# Patient Record
Sex: MALE | Race: WHITE | Employment: FULL TIME | URBAN - METROPOLITAN AREA
[De-identification: names, ages, dates, MRNs, and addresses within clinical notes are randomized per-mention and may not be internally consistent; named-entity substitution may affect disease eponyms.]

---

## 2021-07-29 ENCOUNTER — HOSPITAL ENCOUNTER (OUTPATIENT)
Age: 31
Discharge: HOME | End: 2021-07-29
Payer: COMMERCIAL

## 2021-07-29 VITALS
HEART RATE: 69 BPM | DIASTOLIC BLOOD PRESSURE: 77 MMHG | TEMPERATURE: 97 F | OXYGEN SATURATION: 100 % | SYSTOLIC BLOOD PRESSURE: 130 MMHG | RESPIRATION RATE: 18 BRPM

## 2021-07-29 DIAGNOSIS — J01.40 ACUTE NON-RECURRENT PANSINUSITIS: Primary | ICD-10-CM

## 2021-07-29 PROCEDURE — 99203 OFFICE O/P NEW LOW 30 MIN: CPT | Performed by: EMERGENCY MEDICINE

## 2021-07-29 RX ORDER — AMOXICILLIN AND CLAVULANATE POTASSIUM 875; 125 MG/1; MG/1
1 TABLET, FILM COATED ORAL 2 TIMES DAILY
Qty: 20 TABLET | Refills: 0 | Status: SHIPPED | OUTPATIENT
Start: 2021-07-29 | End: 2021-08-08

## 2021-07-29 NOTE — ED INITIAL ASSESSMENT (HPI)
Pt came in due to nasal congestion and sinus pressure for the past week. Pt denies any fever, chills, cough, or body aches. Pt has easy non labored respirations. Pt is fully verbal and ambulatory.

## 2021-07-29 NOTE — ED PROVIDER NOTES
Patient Seen in: [facility]      History   Patient presents with:  Cough/URI    Stated Complaint: Sinus infection    HPI/Subjective:   HPI    Anat Oh is a 26year old  male here for sinus sx that started 2 weeks ago.  Got better a effusions with mild bulge. No erythema. Nose: Congestion and rhinorrhea (yellow) present. Mouth/Throat:      Mouth: Mucous membranes are moist.      Pharynx: Posterior oropharyngeal erythema present. No oropharyngeal exudate.       Comments: PND ( failure, alcohol abuse, stomach bleed/ulcer, allergy, kidney disease/failure, or if  \"kidneys don't work well. \"    Ibuprofen is the same medication as Motrin, and Advil.     Aleve, ibuprofen, and aspirin are all the same classes of medications called NSAI Tab  Take 1 tablet by mouth 2 (two) times daily for 10 days. , Normal, Disp-20 tablet, R-0

## 2021-09-27 ENCOUNTER — LABORATORY ENCOUNTER (OUTPATIENT)
Dept: LAB | Facility: REFERENCE LAB | Age: 31
End: 2021-09-27
Attending: FAMILY MEDICINE
Payer: COMMERCIAL

## 2021-09-27 ENCOUNTER — OFFICE VISIT (OUTPATIENT)
Dept: FAMILY MEDICINE CLINIC | Facility: CLINIC | Age: 31
End: 2021-09-27
Payer: COMMERCIAL

## 2021-09-27 VITALS
WEIGHT: 179.31 LBS | DIASTOLIC BLOOD PRESSURE: 72 MMHG | HEIGHT: 71 IN | OXYGEN SATURATION: 98 % | HEART RATE: 65 BPM | BODY MASS INDEX: 25.1 KG/M2 | SYSTOLIC BLOOD PRESSURE: 120 MMHG

## 2021-09-27 DIAGNOSIS — B36.0 TINEA VERSICOLOR: ICD-10-CM

## 2021-09-27 DIAGNOSIS — Z00.00 PHYSICAL EXAM, ANNUAL: Primary | ICD-10-CM

## 2021-09-27 DIAGNOSIS — Z13.220 SCREENING FOR HYPERLIPIDEMIA: ICD-10-CM

## 2021-09-27 DIAGNOSIS — Z13.1 DIABETES MELLITUS SCREENING: ICD-10-CM

## 2021-09-27 PROBLEM — K21.9 GERD (GASTROESOPHAGEAL REFLUX DISEASE): Status: ACTIVE | Noted: 2021-09-27

## 2021-09-27 LAB
CHOLEST SERPL-MCNC: 183 MG/DL (ref ?–200)
DEPRECATED RDW RBC AUTO: 38.3 FL (ref 35.1–46.3)
ERYTHROCYTE [DISTWIDTH] IN BLOOD BY AUTOMATED COUNT: 11.8 % (ref 11–15)
EST. AVERAGE GLUCOSE BLD GHB EST-MCNC: 103 MG/DL (ref 68–126)
HBA1C MFR BLD HPLC: 5.2 % (ref ?–5.7)
HCT VFR BLD AUTO: 42.1 %
HDLC SERPL-MCNC: 66 MG/DL (ref 40–59)
HGB BLD-MCNC: 14.1 G/DL
LDLC SERPL CALC-MCNC: 108 MG/DL (ref ?–100)
MCH RBC QN AUTO: 30.1 PG (ref 26–34)
MCHC RBC AUTO-ENTMCNC: 33.5 G/DL (ref 31–37)
MCV RBC AUTO: 89.8 FL
NONHDLC SERPL-MCNC: 117 MG/DL (ref ?–130)
PATIENT FASTING Y/N/NP: NO
PLATELET # BLD AUTO: 186 10(3)UL (ref 150–450)
RBC # BLD AUTO: 4.69 X10(6)UL
TRIGL SERPL-MCNC: 45 MG/DL (ref 30–149)
VLDLC SERPL CALC-MCNC: 8 MG/DL (ref 0–30)
WBC # BLD AUTO: 6.4 X10(3) UL (ref 4–11)

## 2021-09-27 PROCEDURE — 99395 PREV VISIT EST AGE 18-39: CPT | Performed by: FAMILY MEDICINE

## 2021-09-27 PROCEDURE — 3008F BODY MASS INDEX DOCD: CPT | Performed by: FAMILY MEDICINE

## 2021-09-27 PROCEDURE — 80061 LIPID PANEL: CPT | Performed by: FAMILY MEDICINE

## 2021-09-27 PROCEDURE — 36415 COLL VENOUS BLD VENIPUNCTURE: CPT | Performed by: FAMILY MEDICINE

## 2021-09-27 PROCEDURE — 83036 HEMOGLOBIN GLYCOSYLATED A1C: CPT | Performed by: FAMILY MEDICINE

## 2021-09-27 PROCEDURE — 3078F DIAST BP <80 MM HG: CPT | Performed by: FAMILY MEDICINE

## 2021-09-27 PROCEDURE — 90686 IIV4 VACC NO PRSV 0.5 ML IM: CPT | Performed by: FAMILY MEDICINE

## 2021-09-27 PROCEDURE — 90471 IMMUNIZATION ADMIN: CPT | Performed by: FAMILY MEDICINE

## 2021-09-27 PROCEDURE — 85027 COMPLETE CBC AUTOMATED: CPT | Performed by: FAMILY MEDICINE

## 2021-09-27 PROCEDURE — 3074F SYST BP LT 130 MM HG: CPT | Performed by: FAMILY MEDICINE

## 2021-09-27 RX ORDER — FLUCONAZOLE 150 MG/1
300 TABLET ORAL WEEKLY
Qty: 4 TABLET | Refills: 0 | Status: SHIPPED | OUTPATIENT
Start: 2021-09-27 | End: 2021-10-05

## 2021-09-27 NOTE — PROGRESS NOTES
Richie Gannon. is a 26year old male who presents for a complete physical exam.   HPI:     GERD: Chronic. Knows his triggers (coffee before runs. Spicy foods). Takes 1-2 TUMS prn and works well.  Prilosec made him too bloated in the past.    Rash: [place] GENERAL: well developed, well nourished, in no apparent distress  SKIN: hypopigmented macules on b/l UEs, neck, back, and to a lesser extent on chest. no suspicious lesions  HEENT: atraumatic, normocephalic, MMM, nose normal, Tms & EACs normal  EYES: PER QUAD PRESERVATIVE FREE 0.5 ML    Return in 1 year for annual physical or sooner as needed.      Stacey Olivier DO  09/27/21   11:37 AM

## 2021-11-12 ENCOUNTER — MED REC SCAN ONLY (OUTPATIENT)
Dept: FAMILY MEDICINE CLINIC | Facility: CLINIC | Age: 31
End: 2021-11-12

## 2021-12-17 ENCOUNTER — MED REC SCAN ONLY (OUTPATIENT)
Dept: FAMILY MEDICINE CLINIC | Facility: CLINIC | Age: 31
End: 2021-12-17

## 2022-04-12 ENCOUNTER — HOSPITAL ENCOUNTER (OUTPATIENT)
Dept: GENERAL RADIOLOGY | Age: 32
Discharge: HOME | End: 2022-04-12
Attending: CHIROPRACTOR
Payer: COMMERCIAL

## 2022-04-12 DIAGNOSIS — M23.92 INTERNAL DERANGEMENT OF LEFT KNEE: ICD-10-CM

## 2022-04-12 PROCEDURE — 73560 X-RAY EXAM OF KNEE 1 OR 2: CPT | Performed by: CHIROPRACTOR

## 2023-06-20 ENCOUNTER — OFFICE VISIT (OUTPATIENT)
Facility: CLINIC | Age: 33
End: 2023-06-20
Payer: COMMERCIAL

## 2023-06-20 VITALS
WEIGHT: 183 LBS | SYSTOLIC BLOOD PRESSURE: 126 MMHG | HEIGHT: 71 IN | DIASTOLIC BLOOD PRESSURE: 68 MMHG | HEART RATE: 70 BPM | OXYGEN SATURATION: 98 % | BODY MASS INDEX: 25.62 KG/M2

## 2023-06-20 DIAGNOSIS — M62.89 TIGHTNESS OF BOTH GASTROCNEMIUS MUSCLES: ICD-10-CM

## 2023-06-20 DIAGNOSIS — L81.9 HYPOPIGMENTATION: ICD-10-CM

## 2023-06-20 DIAGNOSIS — Z00.00 PHYSICAL EXAM, ANNUAL: Primary | ICD-10-CM

## 2023-06-20 DIAGNOSIS — M24.559 HIP FLEXOR TIGHTNESS, UNSPECIFIED LATERALITY: ICD-10-CM

## 2023-06-20 PROCEDURE — 99395 PREV VISIT EST AGE 18-39: CPT | Performed by: FAMILY MEDICINE

## 2023-06-20 PROCEDURE — 3074F SYST BP LT 130 MM HG: CPT | Performed by: FAMILY MEDICINE

## 2023-06-20 PROCEDURE — 3078F DIAST BP <80 MM HG: CPT | Performed by: FAMILY MEDICINE

## 2023-06-20 PROCEDURE — 3008F BODY MASS INDEX DOCD: CPT | Performed by: FAMILY MEDICINE

## 2024-05-09 ENCOUNTER — OFFICE VISIT (OUTPATIENT)
Facility: CLINIC | Age: 34
End: 2024-05-09
Payer: COMMERCIAL

## 2024-05-09 ENCOUNTER — NURSE TRIAGE (OUTPATIENT)
Facility: CLINIC | Age: 34
End: 2024-05-09

## 2024-05-09 VITALS
BODY MASS INDEX: 27.3 KG/M2 | DIASTOLIC BLOOD PRESSURE: 76 MMHG | HEIGHT: 71 IN | SYSTOLIC BLOOD PRESSURE: 118 MMHG | HEART RATE: 72 BPM | OXYGEN SATURATION: 98 % | WEIGHT: 195 LBS

## 2024-05-09 DIAGNOSIS — J01.90 ACUTE BACTERIAL SINUSITIS: Primary | ICD-10-CM

## 2024-05-09 DIAGNOSIS — B96.89 ACUTE BACTERIAL SINUSITIS: Primary | ICD-10-CM

## 2024-05-09 PROCEDURE — 99213 OFFICE O/P EST LOW 20 MIN: CPT | Performed by: FAMILY MEDICINE

## 2024-05-09 PROCEDURE — 3074F SYST BP LT 130 MM HG: CPT | Performed by: FAMILY MEDICINE

## 2024-05-09 PROCEDURE — 3008F BODY MASS INDEX DOCD: CPT | Performed by: FAMILY MEDICINE

## 2024-05-09 PROCEDURE — 3078F DIAST BP <80 MM HG: CPT | Performed by: FAMILY MEDICINE

## 2024-05-09 RX ORDER — AMOXICILLIN AND CLAVULANATE POTASSIUM 875; 125 MG/1; MG/1
1 TABLET, FILM COATED ORAL 2 TIMES DAILY
Qty: 14 TABLET | Refills: 0 | Status: SHIPPED | OUTPATIENT
Start: 2024-05-09 | End: 2024-05-16

## 2024-05-09 NOTE — PROGRESS NOTES
Chief Complaint   Patient presents with    Sinus Problem     Pressure and ear pressure for about a week       HPI:   Rm Berkowitz Jr. is a 33 year old male who presents for upper respiratory symptoms.    Sx started beginning of last week with cold symptoms.   Now feeling pressure in nose & left maxillary sinus, and fluid in left ear.   Sx include sinus pressure, HA, cough.  No other associated symptoms.  No fevers.   Multiple sick contacts at home.   Last had sinus infection 2 years ago.    Leaving to [place] for work Tuesday.     Of note, he and his family are moving to [place] in 2 months.     Current Outpatient Medications   Medication Sig Dispense Refill    amoxicillin clavulanate 875-125 MG Oral Tab Take 1 tablet by mouth 2 (two) times daily for 7 days. 14 tablet 0      Past Medical History:    GERD (gastroesophageal reflux disease)      Past Surgical History:   Procedure Laterality Date    Tibia fracture surgery  2007    Left Leg    Tonsillectomy        History reviewed. No pertinent family history.   Social History     Socioeconomic History    Marital status:    Tobacco Use    Smoking status: Never    Smokeless tobacco: Never   Substance and Sexual Activity    Alcohol use: Yes     Comment: socially    Drug use: Never         REVIEW OF SYSTEMS:   GENERAL: no fever, no fatigue  SKIN: no rashes or lesions  HEENT: nasal congestion. No rhinorrhea or sore throat. No ear pain or discharge.  +sinus pain.  LUNGS: cough, no SOB  CARDIOVASCULAR: no chest pain  GI: no abdominal pain, no N/V/D      EXAM:   /76   Pulse 72   Ht 5' 11\" (1.803 m)   Wt 195 lb (88.5 kg)   SpO2 98%   BMI 27.20 kg/m²   GENERAL: well developed, well nourished, in no apparent distress  SKIN: no rashes, no suspicious lesions  EYES: PERRLA, EOMI, conjunctiva are clear  HEENT: NCAT. MMM. TM wnl bilaterally. Tonsil +1/4 b/l without erythema or exudate. Boggy erythematous turbinates b/l. Nasal congestion. No sinus tenderness.    NECK: supple, no adenopathy  LUNGS: CTA b/l, no w/r/r, no increased WOB  CARDIO: RRR without murmur  EXT: no edema    ASSESSMENT AND PLAN:   Rm Berkowitz Jr. is a 33 year old male who presents with a sinus infection.      ICD-10-CM    1. Acute bacterial sinusitis  J01.90     B96.89           -Likely 2/2 bacterial sinusitis.  -Tx with augmentin.  -Start flonase and to continue through his travels next week.  -Otherwise continue supportive care.   -Reviewed return precautions.     No orders of the defined types were placed in this encounter.        Doug Madrid DO  05/09/24  1:32 PM

## 2024-05-09 NOTE — TELEPHONE ENCOUNTER
Please reply to pool: EM RN TRIAGE  Action Requested: Summary for Provider     []  Critical Lab, Recommendations Needed  [] Need Additional Advice  [x]   FYI    []   Need Orders  [] Need Medications Sent to Pharmacy  []  Other     SUMMARY: patient contacts clinic reporting sinus pressure and congestion x 4 days.  Headache.  Mild cough.  Denies fever, body aches or chills.  Denies blurred vision.  Requesting appointment.  Acute visit booked today.    Reason for call: Sinus Problem  Onset: Data Unavailable                       Reason for Disposition   Patient wants to be seen    Protocols used: Sinus Pain or Congestion-A-[place]